# Patient Record
Sex: MALE | Race: BLACK OR AFRICAN AMERICAN | NOT HISPANIC OR LATINO | Employment: OTHER | URBAN - METROPOLITAN AREA
[De-identification: names, ages, dates, MRNs, and addresses within clinical notes are randomized per-mention and may not be internally consistent; named-entity substitution may affect disease eponyms.]

---

## 2018-10-02 ENCOUNTER — OFFICE VISIT - HEALTHEAST (OUTPATIENT)
Dept: FAMILY MEDICINE | Facility: CLINIC | Age: 48
End: 2018-10-02

## 2018-10-02 DIAGNOSIS — Z00.00 ANNUAL PHYSICAL EXAM: ICD-10-CM

## 2018-10-02 DIAGNOSIS — Z76.89 ESTABLISHING CARE WITH NEW DOCTOR, ENCOUNTER FOR: ICD-10-CM

## 2018-10-02 DIAGNOSIS — Z12.11 COLON CANCER SCREENING: ICD-10-CM

## 2018-10-02 DIAGNOSIS — D64.9 NORMOCYTIC NORMOCHROMIC ANEMIA: ICD-10-CM

## 2018-10-02 LAB
ALBUMIN SERPL-MCNC: 4.2 G/DL (ref 3.5–5)
ALP SERPL-CCNC: 50 U/L (ref 45–120)
ALT SERPL W P-5'-P-CCNC: 17 U/L (ref 0–45)
ANION GAP SERPL CALCULATED.3IONS-SCNC: 7 MMOL/L (ref 5–18)
AST SERPL W P-5'-P-CCNC: 18 U/L (ref 0–40)
BASOPHILS # BLD AUTO: 0 THOU/UL (ref 0–0.2)
BASOPHILS NFR BLD AUTO: 1 % (ref 0–2)
BILIRUB SERPL-MCNC: 1.3 MG/DL (ref 0–1)
BUN SERPL-MCNC: 15 MG/DL (ref 8–22)
CALCIUM SERPL-MCNC: 9.7 MG/DL (ref 8.5–10.5)
CHLORIDE BLD-SCNC: 109 MMOL/L (ref 98–107)
CHOLEST SERPL-MCNC: 155 MG/DL
CO2 SERPL-SCNC: 26 MMOL/L (ref 22–31)
CREAT SERPL-MCNC: 0.81 MG/DL (ref 0.7–1.3)
EOSINOPHIL # BLD AUTO: 0.1 THOU/UL (ref 0–0.4)
EOSINOPHIL NFR BLD AUTO: 2 % (ref 0–6)
ERYTHROCYTE [DISTWIDTH] IN BLOOD BY AUTOMATED COUNT: 13.5 % (ref 11–14.5)
FASTING STATUS PATIENT QL REPORTED: NO
GFR SERPL CREATININE-BSD FRML MDRD: >60 ML/MIN/1.73M2
GLUCOSE BLD-MCNC: 85 MG/DL (ref 70–125)
HBA1C MFR BLD: 5.5 % (ref 3.5–6)
HCT VFR BLD AUTO: 37.9 % (ref 40–54)
HDLC SERPL-MCNC: 72 MG/DL
HGB BLD-MCNC: 13.7 G/DL (ref 14–18)
LDLC SERPL CALC-MCNC: 64 MG/DL
LYMPHOCYTES # BLD AUTO: 1.7 THOU/UL (ref 0.8–4.4)
LYMPHOCYTES NFR BLD AUTO: 29 % (ref 20–40)
MCH RBC QN AUTO: 29.2 PG (ref 27–34)
MCHC RBC AUTO-ENTMCNC: 36.1 G/DL (ref 32–36)
MCV RBC AUTO: 81 FL (ref 80–100)
MONOCYTES # BLD AUTO: 0.4 THOU/UL (ref 0–0.9)
MONOCYTES NFR BLD AUTO: 8 % (ref 2–10)
NEUTROPHILS # BLD AUTO: 3.6 THOU/UL (ref 2–7.7)
NEUTROPHILS NFR BLD AUTO: 61 % (ref 50–70)
PLATELET # BLD AUTO: 200 THOU/UL (ref 140–440)
PMV BLD AUTO: 11.7 FL (ref 8.5–12.5)
POTASSIUM BLD-SCNC: 4 MMOL/L (ref 3.5–5)
PROT SERPL-MCNC: 6.9 G/DL (ref 6–8)
RBC # BLD AUTO: 4.69 MILL/UL (ref 4.4–6.2)
SODIUM SERPL-SCNC: 142 MMOL/L (ref 136–145)
TRIGL SERPL-MCNC: 97 MG/DL
WBC: 5.9 THOU/UL (ref 4–11)

## 2018-10-02 ASSESSMENT — MIFFLIN-ST. JEOR: SCORE: 1591.91

## 2018-10-03 ENCOUNTER — AMBULATORY - HEALTHEAST (OUTPATIENT)
Dept: FAMILY MEDICINE | Facility: CLINIC | Age: 48
End: 2018-10-03

## 2018-12-03 ENCOUNTER — RECORDS - HEALTHEAST (OUTPATIENT)
Dept: ADMINISTRATIVE | Facility: OTHER | Age: 48
End: 2018-12-03

## 2018-12-04 ENCOUNTER — RECORDS - HEALTHEAST (OUTPATIENT)
Dept: ADMINISTRATIVE | Facility: OTHER | Age: 48
End: 2018-12-04

## 2020-07-31 ENCOUNTER — OFFICE VISIT - HEALTHEAST (OUTPATIENT)
Dept: INTERNAL MEDICINE | Facility: CLINIC | Age: 50
End: 2020-07-31

## 2020-07-31 DIAGNOSIS — J30.2 SEASONAL ALLERGIC RHINITIS, UNSPECIFIED TRIGGER: ICD-10-CM

## 2020-07-31 ASSESSMENT — MIFFLIN-ST. JEOR: SCORE: 1646.34

## 2021-06-01 VITALS — WEIGHT: 172 LBS | HEIGHT: 66 IN | BODY MASS INDEX: 27.64 KG/M2

## 2021-06-04 VITALS
DIASTOLIC BLOOD PRESSURE: 72 MMHG | BODY MASS INDEX: 29.57 KG/M2 | WEIGHT: 184 LBS | SYSTOLIC BLOOD PRESSURE: 134 MMHG | HEART RATE: 73 BPM | HEIGHT: 66 IN | TEMPERATURE: 98.5 F | OXYGEN SATURATION: 98 %
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
## 2021-06-20 NOTE — PROGRESS NOTES
Please call patient and inform:  Overall labs look good. Your bad cholesterol is low and your good cholesterol is high! One of your liver function labs called bilirubin is mildly elevated. I would just repeat this in a few months. You do not have diabetes. Your hemoglobin is slightly low(your red blood cells) which could be due to a little iron deficiency but could also be due to some unnoticed mild blood loss. Therefore I do think it is a good idea to have that colonoscopy done!

## 2021-07-14 PROBLEM — I10 ESSENTIAL HYPERTENSION: Status: RESOLVED | Noted: 2018-10-02 | Resolved: 2018-10-02

## 2021-08-03 PROBLEM — I42.9 PRIMARY CARDIOMYOPATHY (H): Status: RESOLVED | Noted: 2018-10-02 | Resolved: 2018-10-02

## 2021-12-24 ENCOUNTER — HOSPITAL ENCOUNTER (EMERGENCY)
Facility: CLINIC | Age: 51
Discharge: HOME | End: 2021-12-24
Attending: EMERGENCY MEDICINE | Admitting: EMERGENCY MEDICINE
Payer: COMMERCIAL

## 2021-12-24 VITALS
OXYGEN SATURATION: 97 % | DIASTOLIC BLOOD PRESSURE: 79 MMHG | HEART RATE: 110 BPM | RESPIRATION RATE: 22 BRPM | TEMPERATURE: 98 F | SYSTOLIC BLOOD PRESSURE: 124 MMHG | BODY MASS INDEX: 28.83 KG/M2 | WEIGHT: 180 LBS

## 2021-12-24 DIAGNOSIS — R14.0 ABDOMINAL BLOATING: ICD-10-CM

## 2021-12-24 DIAGNOSIS — R00.0 TACHYCARDIA: ICD-10-CM

## 2021-12-24 DIAGNOSIS — R79.89 ELEVATED BRAIN NATRIURETIC PEPTIDE (BNP) LEVEL: ICD-10-CM

## 2021-12-24 PROBLEM — F10.11 ALCOHOL ABUSE, IN REMISSION: Status: ACTIVE | Noted: 2021-12-24

## 2021-12-24 PROBLEM — E83.40 DISORDER OF MAGNESIUM METABOLISM: Status: ACTIVE | Noted: 2021-12-24

## 2021-12-24 PROBLEM — R79.9 ABNORMAL BLOOD CHEMISTRY: Status: ACTIVE | Noted: 2021-12-24

## 2021-12-24 PROBLEM — D36.9 TUBULAR ADENOMA: Status: ACTIVE | Noted: 2018-12-03

## 2021-12-24 LAB
ALBUMIN SERPL-MCNC: 4 G/DL (ref 3.5–5)
ALP SERPL-CCNC: 69 U/L (ref 45–120)
ALT SERPL W P-5'-P-CCNC: 53 U/L (ref 0–45)
ANION GAP SERPL CALCULATED.3IONS-SCNC: 11 MMOL/L (ref 5–18)
AST SERPL W P-5'-P-CCNC: 54 U/L (ref 0–40)
ATRIAL RATE - MUSE: 106 BPM
BILIRUB DIRECT SERPL-MCNC: 0.4 MG/DL
BILIRUB SERPL-MCNC: 1.3 MG/DL (ref 0–1)
BNP SERPL-MCNC: 613 PG/ML (ref 0–40)
BUN SERPL-MCNC: 17 MG/DL (ref 8–22)
CALCIUM SERPL-MCNC: 8.9 MG/DL (ref 8.5–10.5)
CHLORIDE BLD-SCNC: 110 MMOL/L (ref 98–107)
CO2 SERPL-SCNC: 21 MMOL/L (ref 22–31)
CREAT SERPL-MCNC: 1.23 MG/DL (ref 0.7–1.3)
DIASTOLIC BLOOD PRESSURE - MUSE: 89 MMHG
GFR SERPL CREATININE-BSD FRML MDRD: 72 ML/MIN/1.73M2
GLUCOSE BLD-MCNC: 104 MG/DL (ref 70–125)
INTERPRETATION ECG - MUSE: NORMAL
LIPASE SERPL-CCNC: 40 U/L (ref 0–52)
P AXIS - MUSE: 59 DEGREES
POTASSIUM BLD-SCNC: 3.7 MMOL/L (ref 3.5–5)
PR INTERVAL - MUSE: 166 MS
PROT SERPL-MCNC: 6.3 G/DL (ref 6–8)
QRS DURATION - MUSE: 102 MS
QT - MUSE: 344 MS
QTC - MUSE: 456 MS
R AXIS - MUSE: 17 DEGREES
SODIUM SERPL-SCNC: 142 MMOL/L (ref 136–145)
SYSTOLIC BLOOD PRESSURE - MUSE: 125 MMHG
T AXIS - MUSE: -12 DEGREES
TROPONIN I SERPL-MCNC: 0.04 NG/ML (ref 0–0.29)
VENTRICULAR RATE- MUSE: 106 BPM

## 2021-12-24 PROCEDURE — 93005 ELECTROCARDIOGRAM TRACING: CPT | Performed by: EMERGENCY MEDICINE

## 2021-12-24 PROCEDURE — 99284 EMERGENCY DEPT VISIT MOD MDM: CPT

## 2021-12-24 PROCEDURE — 80048 BASIC METABOLIC PNL TOTAL CA: CPT | Performed by: EMERGENCY MEDICINE

## 2021-12-24 PROCEDURE — 82248 BILIRUBIN DIRECT: CPT | Performed by: EMERGENCY MEDICINE

## 2021-12-24 PROCEDURE — 84484 ASSAY OF TROPONIN QUANT: CPT | Performed by: EMERGENCY MEDICINE

## 2021-12-24 PROCEDURE — 83690 ASSAY OF LIPASE: CPT | Performed by: EMERGENCY MEDICINE

## 2021-12-24 PROCEDURE — 83880 ASSAY OF NATRIURETIC PEPTIDE: CPT | Performed by: EMERGENCY MEDICINE

## 2021-12-24 PROCEDURE — 36415 COLL VENOUS BLD VENIPUNCTURE: CPT | Performed by: EMERGENCY MEDICINE

## 2021-12-24 ASSESSMENT — ENCOUNTER SYMPTOMS
CHILLS: 0
ABDOMINAL DISTENTION: 1
BACK PAIN: 0
APPETITE CHANGE: 0
FEVER: 0
ABDOMINAL PAIN: 0
DIARRHEA: 0
SHORTNESS OF BREATH: 1
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
## 2021-12-25 NOTE — DISCHARGE INSTRUCTIONS
Because you have a history of cardiomyopathy, I checked your BNP level.  This is something that goes up with your heart is unrestrained.  I did peer to be elevated today.  Not sure what your previous levels have been, but I think it is important that you are seen by cardiology next week.  I placed an urgent referral for this.  I do not think this is why your abdomen feels bloated.  I also am sending you home with medication called omeprazole.  You can take this each morning.  After couple of days you should notice the bloating, acid reflux symptoms to improve.    In the meantime, if your shortness of breath returns, does not get any better with rest, or you are experiencing any chest pain, please come back for reevaluation or call 911

## 2021-12-25 NOTE — ED TRIAGE NOTES
"Pt denies abd pain but states he has been \"bloated\" for a couple of weeks.  Off/on.  Denies NVCD  "

## 2021-12-27 ENCOUNTER — PATIENT OUTREACH (OUTPATIENT)
Dept: FAMILY MEDICINE | Facility: CLINIC | Age: 51
End: 2021-12-27
Payer: COMMERCIAL

## 2021-12-27 NOTE — TELEPHONE ENCOUNTER
ED / Discharge Outreach Protocol    Patient Contact    Attempt # 1    Was call answered?  No.  Left message on voicemail with information to call me back.    Sadaf Swift RN

## 2021-12-28 NOTE — TELEPHONE ENCOUNTER
ED / Discharge Outreach Protocol    Patient Contact    Attempt # 2    Was call answered?  No.  Left message on voicemail with information to call me back.    Sadaf Swift RN

## 2022-01-01 ENCOUNTER — APPOINTMENT (OUTPATIENT)
Dept: ULTRASOUND IMAGING | Facility: CLINIC | Age: 52
End: 2022-01-01
Attending: EMERGENCY MEDICINE
Payer: COMMERCIAL

## 2022-01-01 ENCOUNTER — APPOINTMENT (OUTPATIENT)
Dept: CT IMAGING | Facility: CLINIC | Age: 52
End: 2022-01-01
Attending: EMERGENCY MEDICINE
Payer: COMMERCIAL

## 2022-01-01 ENCOUNTER — HOSPITAL ENCOUNTER (INPATIENT)
Facility: CLINIC | Age: 52
LOS: 2 days | Discharge: HOME | End: 2022-01-03
Attending: EMERGENCY MEDICINE | Admitting: FAMILY MEDICINE
Payer: COMMERCIAL

## 2022-01-01 DIAGNOSIS — I50.23 ACUTE ON CHRONIC SYSTOLIC CONGESTIVE HEART FAILURE (H): Primary | ICD-10-CM

## 2022-01-01 DIAGNOSIS — I50.20 SYSTOLIC CONGESTIVE HEART FAILURE, UNSPECIFIED HF CHRONICITY (H): ICD-10-CM

## 2022-01-01 PROBLEM — I50.9 CHF (CONGESTIVE HEART FAILURE) (H): Status: ACTIVE | Noted: 2022-01-01

## 2022-01-01 LAB
ALBUMIN SERPL-MCNC: 3.7 G/DL (ref 3.5–5)
ALP SERPL-CCNC: 74 U/L (ref 45–120)
ALT SERPL W P-5'-P-CCNC: 96 U/L (ref 0–45)
ANION GAP SERPL CALCULATED.3IONS-SCNC: 13 MMOL/L (ref 5–18)
AST SERPL W P-5'-P-CCNC: 58 U/L (ref 0–40)
BASOPHILS # BLD AUTO: 0.1 10E3/UL (ref 0–0.2)
BASOPHILS NFR BLD AUTO: 1 %
BILIRUB SERPL-MCNC: 1.5 MG/DL (ref 0–1)
BNP SERPL-MCNC: 1147 PG/ML (ref 0–40)
BUN SERPL-MCNC: 20 MG/DL (ref 8–22)
CALCIUM SERPL-MCNC: 9.1 MG/DL (ref 8.5–10.5)
CHLORIDE BLD-SCNC: 114 MMOL/L (ref 98–107)
CO2 SERPL-SCNC: 20 MMOL/L (ref 22–31)
CREAT SERPL-MCNC: 1.01 MG/DL (ref 0.7–1.3)
EOSINOPHIL # BLD AUTO: 0.1 10E3/UL (ref 0–0.7)
EOSINOPHIL NFR BLD AUTO: 1 %
ERYTHROCYTE [DISTWIDTH] IN BLOOD BY AUTOMATED COUNT: 14.7 % (ref 10–15)
GFR SERPL CREATININE-BSD FRML MDRD: >90 ML/MIN/1.73M2
GLUCOSE BLD-MCNC: 115 MG/DL (ref 70–125)
HCT VFR BLD AUTO: 37.1 % (ref 40–53)
HGB BLD-MCNC: 13.4 G/DL (ref 13.3–17.7)
IMM GRANULOCYTES # BLD: 0 10E3/UL
IMM GRANULOCYTES NFR BLD: 0 %
LIPASE SERPL-CCNC: 44 U/L (ref 0–52)
LYMPHOCYTES # BLD AUTO: 1.8 10E3/UL (ref 0.8–5.3)
LYMPHOCYTES NFR BLD AUTO: 38 %
MAGNESIUM SERPL-MCNC: 1.8 MG/DL (ref 1.8–2.6)
MCH RBC QN AUTO: 30.9 PG (ref 26.5–33)
MCHC RBC AUTO-ENTMCNC: 36.1 G/DL (ref 31.5–36.5)
MCV RBC AUTO: 86 FL (ref 78–100)
MONOCYTES # BLD AUTO: 0.4 10E3/UL (ref 0–1.3)
MONOCYTES NFR BLD AUTO: 8 %
NEUTROPHILS # BLD AUTO: 2.5 10E3/UL (ref 1.6–8.3)
NEUTROPHILS NFR BLD AUTO: 52 %
NRBC # BLD AUTO: 0 10E3/UL
NRBC BLD AUTO-RTO: 0 /100
PLATELET # BLD AUTO: 195 10E3/UL (ref 150–450)
POTASSIUM BLD-SCNC: 3.6 MMOL/L (ref 3.5–5)
PROT SERPL-MCNC: 5.9 G/DL (ref 6–8)
RBC # BLD AUTO: 4.34 10E6/UL (ref 4.4–5.9)
SARS-COV-2 RNA RESP QL NAA+PROBE: NEGATIVE
SODIUM SERPL-SCNC: 147 MMOL/L (ref 136–145)
TROPONIN I SERPL-MCNC: 0.04 NG/ML (ref 0–0.29)
WBC # BLD AUTO: 4.9 10E3/UL (ref 4–11)

## 2022-01-01 PROCEDURE — 74177 CT ABD & PELVIS W/CONTRAST: CPT

## 2022-01-01 PROCEDURE — 83690 ASSAY OF LIPASE: CPT | Performed by: EMERGENCY MEDICINE

## 2022-01-01 PROCEDURE — 93005 ELECTROCARDIOGRAM TRACING: CPT | Performed by: EMERGENCY MEDICINE

## 2022-01-01 PROCEDURE — 83735 ASSAY OF MAGNESIUM: CPT | Performed by: EMERGENCY MEDICINE

## 2022-01-01 PROCEDURE — 210N000002 HC R&B HEART CARE

## 2022-01-01 PROCEDURE — 80053 COMPREHEN METABOLIC PANEL: CPT | Performed by: EMERGENCY MEDICINE

## 2022-01-01 PROCEDURE — 85025 COMPLETE CBC W/AUTO DIFF WBC: CPT | Performed by: EMERGENCY MEDICINE

## 2022-01-01 PROCEDURE — 87635 SARS-COV-2 COVID-19 AMP PRB: CPT | Performed by: EMERGENCY MEDICINE

## 2022-01-01 PROCEDURE — 250N000011 HC RX IP 250 OP 636: Performed by: EMERGENCY MEDICINE

## 2022-01-01 PROCEDURE — 76705 ECHO EXAM OF ABDOMEN: CPT

## 2022-01-01 PROCEDURE — 96374 THER/PROPH/DIAG INJ IV PUSH: CPT

## 2022-01-01 PROCEDURE — 71275 CT ANGIOGRAPHY CHEST: CPT

## 2022-01-01 PROCEDURE — 84484 ASSAY OF TROPONIN QUANT: CPT | Performed by: EMERGENCY MEDICINE

## 2022-01-01 PROCEDURE — 36415 COLL VENOUS BLD VENIPUNCTURE: CPT | Performed by: EMERGENCY MEDICINE

## 2022-01-01 PROCEDURE — 99285 EMERGENCY DEPT VISIT HI MDM: CPT | Mod: 25

## 2022-01-01 PROCEDURE — 84443 ASSAY THYROID STIM HORMONE: CPT | Performed by: STUDENT IN AN ORGANIZED HEALTH CARE EDUCATION/TRAINING PROGRAM

## 2022-01-01 PROCEDURE — C9803 HOPD COVID-19 SPEC COLLECT: HCPCS

## 2022-01-01 PROCEDURE — 83880 ASSAY OF NATRIURETIC PEPTIDE: CPT | Performed by: EMERGENCY MEDICINE

## 2022-01-01 RX ORDER — IOPAMIDOL 755 MG/ML
100 INJECTION, SOLUTION INTRAVASCULAR ONCE
Status: COMPLETED | OUTPATIENT
Start: 2022-01-01 | End: 2022-01-01

## 2022-01-01 RX ORDER — FUROSEMIDE 10 MG/ML
60 INJECTION INTRAMUSCULAR; INTRAVENOUS ONCE
Status: COMPLETED | OUTPATIENT
Start: 2022-01-01 | End: 2022-01-01

## 2022-01-01 RX ADMIN — IOPAMIDOL 100 ML: 755 INJECTION, SOLUTION INTRAVENOUS at 20:39

## 2022-01-01 RX ADMIN — FUROSEMIDE 60 MG: 10 INJECTION, SOLUTION INTRAMUSCULAR; INTRAVENOUS at 22:55

## 2022-01-01 ASSESSMENT — ENCOUNTER SYMPTOMS
FEVER: 0
SORE THROAT: 0
SHORTNESS OF BREATH: 1
CHILLS: 0
ABDOMINAL DISTENTION: 1

## 2022-01-02 ENCOUNTER — APPOINTMENT (OUTPATIENT)
Dept: CARDIOLOGY | Facility: CLINIC | Age: 52
End: 2022-01-02
Payer: COMMERCIAL

## 2022-01-02 LAB
ALBUMIN SERPL-MCNC: 3.8 G/DL (ref 3.5–5)
ALP SERPL-CCNC: 84 U/L (ref 45–120)
ALT SERPL W P-5'-P-CCNC: 110 U/L (ref 0–45)
ANION GAP SERPL CALCULATED.3IONS-SCNC: 15 MMOL/L (ref 5–18)
AST SERPL W P-5'-P-CCNC: 69 U/L (ref 0–40)
ATRIAL RATE - MUSE: 110 BPM
BILIRUB SERPL-MCNC: 1.2 MG/DL (ref 0–1)
BUN SERPL-MCNC: 18 MG/DL (ref 8–22)
CALCIUM SERPL-MCNC: 8.9 MG/DL (ref 8.5–10.5)
CHLORIDE BLD-SCNC: 108 MMOL/L (ref 98–107)
CO2 SERPL-SCNC: 24 MMOL/L (ref 22–31)
CREAT SERPL-MCNC: 1.17 MG/DL (ref 0.7–1.3)
DIASTOLIC BLOOD PRESSURE - MUSE: NORMAL MMHG
GFR SERPL CREATININE-BSD FRML MDRD: 76 ML/MIN/1.73M2
GLUCOSE BLD-MCNC: 101 MG/DL (ref 70–125)
INTERPRETATION ECG - MUSE: NORMAL
LVEF ECHO: NORMAL
P AXIS - MUSE: 62 DEGREES
POTASSIUM BLD-SCNC: 3.6 MMOL/L (ref 3.5–5)
PR INTERVAL - MUSE: 170 MS
PROT SERPL-MCNC: 6.1 G/DL (ref 6–8)
QRS DURATION - MUSE: 94 MS
QT - MUSE: 286 MS
QTC - MUSE: 387 MS
R AXIS - MUSE: 33 DEGREES
SODIUM SERPL-SCNC: 147 MMOL/L (ref 136–145)
SYSTOLIC BLOOD PRESSURE - MUSE: NORMAL MMHG
T AXIS - MUSE: 37 DEGREES
TSH SERPL DL<=0.005 MIU/L-ACNC: 2.93 UIU/ML (ref 0.3–5)
VENTRICULAR RATE- MUSE: 110 BPM

## 2022-01-02 PROCEDURE — 93306 TTE W/DOPPLER COMPLETE: CPT | Mod: 26 | Performed by: INTERNAL MEDICINE

## 2022-01-02 PROCEDURE — 36415 COLL VENOUS BLD VENIPUNCTURE: CPT | Performed by: STUDENT IN AN ORGANIZED HEALTH CARE EDUCATION/TRAINING PROGRAM

## 2022-01-02 PROCEDURE — 250N000011 HC RX IP 250 OP 636: Performed by: STUDENT IN AN ORGANIZED HEALTH CARE EDUCATION/TRAINING PROGRAM

## 2022-01-02 PROCEDURE — 210N000002 HC R&B HEART CARE

## 2022-01-02 PROCEDURE — 80053 COMPREHEN METABOLIC PANEL: CPT | Performed by: STUDENT IN AN ORGANIZED HEALTH CARE EDUCATION/TRAINING PROGRAM

## 2022-01-02 PROCEDURE — 99223 1ST HOSP IP/OBS HIGH 75: CPT | Performed by: INTERNAL MEDICINE

## 2022-01-02 PROCEDURE — 99222 1ST HOSP IP/OBS MODERATE 55: CPT | Mod: GC | Performed by: STUDENT IN AN ORGANIZED HEALTH CARE EDUCATION/TRAINING PROGRAM

## 2022-01-02 PROCEDURE — 250N000013 HC RX MED GY IP 250 OP 250 PS 637: Performed by: INTERNAL MEDICINE

## 2022-01-02 PROCEDURE — 93306 TTE W/DOPPLER COMPLETE: CPT

## 2022-01-02 RX ORDER — ACETAMINOPHEN 650 MG/1
650 SUPPOSITORY RECTAL EVERY 6 HOURS PRN
Status: DISCONTINUED | OUTPATIENT
Start: 2022-01-02 | End: 2022-01-03 | Stop reason: HOSPADM

## 2022-01-02 RX ORDER — CARVEDILOL 3.12 MG/1
3.12 TABLET ORAL 2 TIMES DAILY WITH MEALS
Status: DISCONTINUED | OUTPATIENT
Start: 2022-01-02 | End: 2022-01-03 | Stop reason: HOSPADM

## 2022-01-02 RX ORDER — POLYETHYLENE GLYCOL 3350 17 G/17G
17 POWDER, FOR SOLUTION ORAL DAILY
Status: DISCONTINUED | OUTPATIENT
Start: 2022-01-02 | End: 2022-01-03 | Stop reason: HOSPADM

## 2022-01-02 RX ORDER — ACETAMINOPHEN 325 MG/1
650 TABLET ORAL EVERY 6 HOURS PRN
Status: DISCONTINUED | OUTPATIENT
Start: 2022-01-02 | End: 2022-01-03 | Stop reason: HOSPADM

## 2022-01-02 RX ORDER — FUROSEMIDE 10 MG/ML
60 INJECTION INTRAMUSCULAR; INTRAVENOUS EVERY 12 HOURS
Status: DISCONTINUED | OUTPATIENT
Start: 2022-01-02 | End: 2022-01-03

## 2022-01-02 RX ORDER — LIDOCAINE 40 MG/G
CREAM TOPICAL
Status: DISCONTINUED | OUTPATIENT
Start: 2022-01-02 | End: 2022-01-03 | Stop reason: HOSPADM

## 2022-01-02 RX ADMIN — FUROSEMIDE 60 MG: 10 INJECTION, SOLUTION INTRAMUSCULAR; INTRAVENOUS at 19:01

## 2022-01-02 RX ADMIN — CARVEDILOL 3.12 MG: 3.12 TABLET, FILM COATED ORAL at 12:00

## 2022-01-02 RX ADMIN — CARVEDILOL 3.12 MG: 3.12 TABLET, FILM COATED ORAL at 16:35

## 2022-01-02 RX ADMIN — FUROSEMIDE 60 MG: 10 INJECTION, SOLUTION INTRAMUSCULAR; INTRAVENOUS at 05:53

## 2022-01-02 ASSESSMENT — ACTIVITIES OF DAILY LIVING (ADL)
ADLS_ACUITY_SCORE: 3
WALKING_OR_CLIMBING_STAIRS_DIFFICULTY: NO
ADLS_ACUITY_SCORE: 3
ADLS_ACUITY_SCORE: 7
DOING_ERRANDS_INDEPENDENTLY_DIFFICULTY: NO
ADLS_ACUITY_SCORE: 3
ADLS_ACUITY_SCORE: 3
CONCENTRATING,_REMEMBERING_OR_MAKING_DECISIONS_DIFFICULTY: NO
TOILETING_ISSUES: NO
ADLS_ACUITY_SCORE: 7
DIFFICULTY_EATING/SWALLOWING: NO
FALL_HISTORY_WITHIN_LAST_SIX_MONTHS: NO
ADLS_ACUITY_SCORE: 3
HEARING_DIFFICULTY_OR_DEAF: NO
ADLS_ACUITY_SCORE: 3
DIFFICULTY_COMMUNICATING: NO
ADLS_ACUITY_SCORE: 3
WEAR_GLASSES_OR_BLIND: NO
ADLS_ACUITY_SCORE: 3
DRESSING/BATHING_DIFFICULTY: NO
ADLS_ACUITY_SCORE: 3
ADLS_ACUITY_SCORE: 7
ADLS_ACUITY_SCORE: 3
PATIENT_/_FAMILY_COMMUNICATION_STYLE: SPOKEN LANGUAGE (ENGLISH OR BILINGUAL)

## 2022-01-02 ASSESSMENT — MIFFLIN-ST. JEOR
SCORE: 1614.69
SCORE: 1628.3

## 2022-01-02 NOTE — PHARMACY-ADMISSION MEDICATION HISTORY
Pharmacy Note - Admission Medication History    Pertinent Provider Information: ...stopped BP meds ~4 years ago     ______________________________________________________________________    Prior To Admission (PTA) med list completed and updated in EMR.       PTA Med List   Medication Sig Last Dose     omeprazole (PRILOSEC) 20 MG DR capsule Take 1 capsule (20 mg) by mouth daily 1/1/2022 at Unknown time       Information source(s): Patient and CareEverywhere/[facility]  Method of interview communication: in-person    Summary of Changes to PTA Med List  New: none  Discontinued: enalapril, carvedilol  Changed: none    Patient was asked about OTC/herbal products specifically.  PTA med list reflects this.    In the past week, patient estimated taking medication this percent of the time:  greater than 90%.    Allergies were reviewed, assessed, and updated with the patient.        The information provided in this note is only as accurate as the sources available at the time of the update(s).    Thank you for the opportunity to participate in the care of this patient.    Tadeo Richardson RPH  1/1/2022 9:18 PM

## 2022-01-02 NOTE — PROGRESS NOTES
Echocardiogram shows severe reduction left ventricular systolic function with an estimated ejection fraction of 25%.  The reduction left ventricular systolic function is global in nature.  There is moderately severe MR, moderate tricuspid insufficiency with a right ventricular systolic pressure estimate of 64 mm plus right atrial pressure) significantly elevated).    When he was treated for his initial cardiomyopathy which normalized, he was taking Toprol, digoxin, lisinopril, and furosemide.  More recently he was on carvedilol 3.125 mg twice daily.  We will start him on carvedilol 3.125 mg twice daily and titrate up.  Continue intravenous diuretics tonight, switch to oral Lasix tomorrow and hopefully can discharge soon.    He will need a early follow-up with one of our nurse practitioners here at [facility] in the next 1 to 2 weeks.

## 2022-01-02 NOTE — PROGRESS NOTES
Daily Progress Note    Assessment & Plan: John Iqbal is a 51 year old male admitted on 1/1/2022. He has a history of CHF secondary to alcohol use disorder, HTN and was admitted for dyspnea with exertion concerning for CHF exacerbation.  Active Problems:    CHF (congestive heart failure) (H)    CHF Exacerbation  Right sided heart failure  BNP 1147 on admission with patient complaining of dyspnea on exertion. Previous echo on 1/7/2021 significant for EF of 57% and he has been on no medications. Echo today revealed severe reduction in LV systolic function with EF of 25%. Moderate-severe MR, moderate tricuspid insufficiency and RA pressure elevated. This change in status believed to be due to life stressors, resumption of alcohol use, and significant use of energy drinks.   - Cardiology consulted, recommendations appreciated   - Will continue IV lasix today, transition to PO tomorrow   - Start Carvedilol BID  - Strict I/O, daily weight   - Encourage lifestyle modifications: limit energy drinks, alcohol abstinence    Transaminitis  ALT 96-->110, AST 58-->69. Believed to be due to hepatic congestion 2/2 CHF exacerbation.   - Trend CMP in AM    Alcohol Use Disorder  Patients states he has had 4-5 drinks while in [place] just prior to symptom onset. He otherwise does not drink often but is willing to quit completely. Discussion today very positive.  - Continue to encourage cessation     Patient desires Covid booster upon discharge.     Diet: Combination Diet Low Saturated Fat Na <2400mg Diet, No Caffeine Diet    DVT Prophylaxis: Low Risk/Ambulatory with no VTE prophylaxis indicated  Miranda Catheter: Not present  Fluids: PO  Central Lines: None  Code Status: Full Code      Barriers to discharge: Medical   Anticipated discharge day: Tomorrow  Disposition: Home  Status: Inpatient  Length of Stay: 1     Patient discussed with attending physician, Dr. Chris Yepez , who agrees with the plan.     Clarence Ulrich DO  PGY1  [facility]  Pager: [phone] (from 8AM-5:30PM)  Please call the senior pager with any questions or concerns, 24/7: [phone].    Subjective/Interval events:  Admitted overnight. Tolerated IV lasix well. Now feeling much better and able to ambulate well. On room air comfortably.   ROS: Denies headache, dizziness, chest pain, shortness of breath, fevers, chills, nausea, abdominal pain, diarrhea.      Objective  Physical Exam   Vital Signs: Temp: 98.4  F (36.9  C) Temp src: Oral BP: 126/81 Pulse: 100   Resp: 16 SpO2: 95 % O2 Device: None (Room air)    Weight: 175 lbs 8 oz  Constitutional: awake, alert, cooperative, no apparent distress, and appears stated age  Respiratory: No increased work of breathing, good air exchange, clear to auscultation bilaterally, no crackles or wheezing  Cardiovascular: RRR, 3-4/6 systolic murmur  GI: No scars, normal bowel sounds, soft, non-distended, non-tender, no masses palpated, no hepatosplenomegally  Skin: normal skin color, texture, turgor  Musculoskeletal: There is no redness, warmth, or swelling of the joints.  Full range of motion noted.  Motor strength is 5 out of 5 all extremities bilaterally.  Tone is normal.  Neurologic: Awake, alert, oriented to name, place and time.  Cranial nerves II-XII are grossly intact.     Intake/Output last 3 shifts  I/O last 3 completed shifts:  In: -   Out: 950 [Urine:950]  Pertinent Labs   Recent Labs   Lab 01/01/22 2024   WBC 4.9   HGB 13.4   HCT 37.1*        Recent Labs   Lab 01/02/22  0528 01/01/22 2024   * 147*   CO2 24 20*   BUN 18 20   ALBUMIN 3.8 3.7   ALKPHOS 84 74   * 96*   AST 69* 58*     No results for input(s): INR, PTT in the last 168 hours.    Invalid input(s): APTT  Pertinent Radiology    Echocardiogram Complete  Result Date: 1/2/2022  Interpretation Summary  The left ventricle is moderately dilated. The visual ejection fraction is 25-30%.  There is severe global hypokinesia of the left ventricle. The right ventricle is mildly dilated. Mildly decreased right ventricular systolic function There is moderately severe (3+) mitral regurgitation. There is moderate (2+) tricuspid regurgitation. The right ventricular systolic pressure is approximated at 64mmHg plus the right atrial pressure. Right ventricular diastolic pressure is approximated at 15mmHg plus the right atrial pressure. The ascending aorta is Mildly dilated. IVC diameter >2.1 cm collapsing <50% with sniff suggests a high RA pressure estimated at 15 mmHg or greater. Small pericardial effusion The rhythm was sinus tachycardia.     Abdomen US, limited (RUQ only)  Result Date: 1/1/2022  EXAM: US ABDOMEN LIMITED LOCATION: [facility]   IMPRESSION: 1.  Significant gallbladder wall thickening. No gallstones are seen.     CT Abdomen Pelvis w Contrast  Result Date: 1/1/2022  EXAM: CT ABDOMEN PELVIS W CONTRAST LOCATION: [facility]  IMPRESSION: 1.  Markedly enlarged heart. 2.  Passive congestion of the liver consistent with right-sided heart failure. 3.  Edematous gallbladder, likely secondary to right-sided heart failure, if clinical concern persists, consider correlation with ultrasound. 4.  Diverticulosis. No diverticulitis, colitis, or obstruction.    CT Chest Pulmonary Embolism w Contrast  Result Date: 1/1/2022  EXAM: CT CHEST PULMONARY EMBOLISM W CONTRAST LOCATION:   IMPRESSION: 1.  No pulmonary embolism. 2.  Moderate global cardiomegaly. Reflux of contrast into the dilated IVC consistent with right heart dysfunction and/or elevated pressures. 3.  Mild basilar opacities suggestive of atelectasis. No pulmonary edema. 4.  No pleural effusion.     Current Medications.     carvedilol  3.125 mg Oral BID w/meals     furosemide  60 mg Intravenous Q12H     polyethylene glycol  17 g Oral Daily     sodium chloride (PF)  3 mL Intracatheter Q8H     PRN:acetaminophen **OR** acetaminophen, lidocaine 4%,  lidocaine (buffered or not buffered), melatonin, sodium chloride (PF)    This note was created with help of Dragon dictation system. Grammatical /typing errors are not intentional.
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
## 2022-01-02 NOTE — ED NOTES
"Attempted to call report. RN to call back. Denies pain. Has been up independent to bathroom. Reports \"I feel better, less bloated already.\"   /73   Pulse 101   Temp (!) 96.3  F (35.7  C) (Temporal)   Resp (!) 37   Wt 81.6 kg (180 lb)   SpO2 98%   BMI 28.83 kg/m      "
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
## 2022-01-02 NOTE — PLAN OF CARE
Problem: Adjustment to Illness (Heart Failure)  Goal: Optimal Coping  Outcome: No Change     Problem: Cardiac Output Decreased (Heart Failure)  Goal: Optimal Cardiac Output  Outcome: No Change     Problem: Dysrhythmia (Heart Failure)  Goal: Stable Heart Rate and Rhythm  Outcome: No Change   Pt admitted with CHF exacerbation from the ED.  Pt c/o SOB and chest discomfort.  Pt has HX of bout of CHF 15 years ago.  Pt 's on admit with PVC's noted.  Pt states he drank an energy drink in the evening when he was feeling fatigued.  Pt denies chest pain. Pt instructed to decrease his fluid intake and measure his urine output.

## 2022-01-02 NOTE — ED TRIAGE NOTES
Patient is here with shortness of breath. He was here on the 25th and given omeprazole which has not helped. He also had an echo and blood tests and all was clear.

## 2022-01-03 VITALS
SYSTOLIC BLOOD PRESSURE: 99 MMHG | WEIGHT: 171.3 LBS | OXYGEN SATURATION: 98 % | HEART RATE: 100 BPM | DIASTOLIC BLOOD PRESSURE: 76 MMHG | HEIGHT: 67 IN | RESPIRATION RATE: 18 BRPM | TEMPERATURE: 97.1 F | BODY MASS INDEX: 26.89 KG/M2

## 2022-01-03 PROBLEM — I50.23 ACUTE ON CHRONIC SYSTOLIC CONGESTIVE HEART FAILURE (H): Status: ACTIVE | Noted: 2022-01-01

## 2022-01-03 PROBLEM — F10.20 ALCOHOL USE DISORDER, MODERATE, DEPENDENCE (H): Status: ACTIVE | Noted: 2021-12-24

## 2022-01-03 LAB
ALBUMIN SERPL-MCNC: 3.6 G/DL (ref 3.5–5)
ALP SERPL-CCNC: 84 U/L (ref 45–120)
ALT SERPL W P-5'-P-CCNC: 113 U/L (ref 0–45)
ANION GAP SERPL CALCULATED.3IONS-SCNC: 14 MMOL/L (ref 5–18)
AST SERPL W P-5'-P-CCNC: 61 U/L (ref 0–40)
BILIRUB SERPL-MCNC: 1.2 MG/DL (ref 0–1)
BUN SERPL-MCNC: 22 MG/DL (ref 8–22)
CALCIUM SERPL-MCNC: 9 MG/DL (ref 8.5–10.5)
CHLORIDE BLD-SCNC: 105 MMOL/L (ref 98–107)
CO2 SERPL-SCNC: 27 MMOL/L (ref 22–31)
CREAT SERPL-MCNC: 1.11 MG/DL (ref 0.7–1.3)
GFR SERPL CREATININE-BSD FRML MDRD: 81 ML/MIN/1.73M2
GLUCOSE BLD-MCNC: 106 MG/DL (ref 70–125)
POTASSIUM BLD-SCNC: 3 MMOL/L (ref 3.5–5)
PROT SERPL-MCNC: 6 G/DL (ref 6–8)
SODIUM SERPL-SCNC: 146 MMOL/L (ref 136–145)

## 2022-01-03 PROCEDURE — 250N000013 HC RX MED GY IP 250 OP 250 PS 637: Performed by: INTERNAL MEDICINE

## 2022-01-03 PROCEDURE — 80053 COMPREHEN METABOLIC PANEL: CPT

## 2022-01-03 PROCEDURE — 36415 COLL VENOUS BLD VENIPUNCTURE: CPT

## 2022-01-03 PROCEDURE — 99233 SBSQ HOSP IP/OBS HIGH 50: CPT | Performed by: INTERNAL MEDICINE

## 2022-01-03 PROCEDURE — 99238 HOSP IP/OBS DSCHRG MGMT 30/<: CPT | Mod: GC | Performed by: STUDENT IN AN ORGANIZED HEALTH CARE EDUCATION/TRAINING PROGRAM

## 2022-01-03 PROCEDURE — 250N000011 HC RX IP 250 OP 636: Performed by: STUDENT IN AN ORGANIZED HEALTH CARE EDUCATION/TRAINING PROGRAM

## 2022-01-03 PROCEDURE — 82040 ASSAY OF SERUM ALBUMIN: CPT

## 2022-01-03 RX ORDER — LISINOPRIL 5 MG/1
5 TABLET ORAL DAILY
Qty: 30 TABLET | Refills: 0 | Status: SHIPPED | OUTPATIENT
Start: 2022-01-04 | End: 2022-02-10

## 2022-01-03 RX ORDER — POTASSIUM CHLORIDE 1500 MG/1
40 TABLET, EXTENDED RELEASE ORAL ONCE
Status: COMPLETED | OUTPATIENT
Start: 2022-01-03 | End: 2022-01-03

## 2022-01-03 RX ORDER — FUROSEMIDE 40 MG
40 TABLET ORAL DAILY
Status: DISCONTINUED | OUTPATIENT
Start: 2022-01-04 | End: 2022-01-03 | Stop reason: HOSPADM

## 2022-01-03 RX ORDER — SPIRONOLACTONE 25 MG/1
25 TABLET ORAL DAILY
Status: DISCONTINUED | OUTPATIENT
Start: 2022-01-03 | End: 2022-01-03 | Stop reason: HOSPADM

## 2022-01-03 RX ORDER — CARVEDILOL 3.12 MG/1
3.12 TABLET ORAL 2 TIMES DAILY WITH MEALS
Qty: 60 TABLET | Refills: 0 | Status: SHIPPED | OUTPATIENT
Start: 2022-01-03 | End: 2022-02-10

## 2022-01-03 RX ORDER — FUROSEMIDE 40 MG
40 TABLET ORAL DAILY
Qty: 30 TABLET | Refills: 0 | Status: SHIPPED | OUTPATIENT
Start: 2022-01-04 | End: 2022-01-13

## 2022-01-03 RX ORDER — LISINOPRIL 5 MG/1
5 TABLET ORAL DAILY
Status: DISCONTINUED | OUTPATIENT
Start: 2022-01-03 | End: 2022-01-03 | Stop reason: HOSPADM

## 2022-01-03 RX ORDER — SPIRONOLACTONE 25 MG/1
25 TABLET ORAL DAILY
Qty: 30 TABLET | Refills: 0 | Status: SHIPPED | OUTPATIENT
Start: 2022-01-04 | End: 2022-02-10

## 2022-01-03 RX ADMIN — LISINOPRIL 5 MG: 5 TABLET ORAL at 09:33

## 2022-01-03 RX ADMIN — POTASSIUM CHLORIDE 40 MEQ: 1500 TABLET, EXTENDED RELEASE ORAL at 09:33

## 2022-01-03 RX ADMIN — CARVEDILOL 3.12 MG: 3.12 TABLET, FILM COATED ORAL at 08:55

## 2022-01-03 RX ADMIN — FUROSEMIDE 60 MG: 10 INJECTION, SOLUTION INTRAMUSCULAR; INTRAVENOUS at 06:58

## 2022-01-03 RX ADMIN — SPIRONOLACTONE 25 MG: 25 TABLET ORAL at 09:33

## 2022-01-03 ASSESSMENT — ACTIVITIES OF DAILY LIVING (ADL)
ADLS_ACUITY_SCORE: 3

## 2022-01-03 ASSESSMENT — MIFFLIN-ST. JEOR: SCORE: 1595.64

## 2022-01-03 NOTE — PLAN OF CARE
Discharge education not provided due to telling the patient paperwork would be ready by 1215. Went into the room to do discharge education on meds, follow up appointments, and daily care. Could not go over this due to patient already leaving the room and not notifying anyone he was leaving.    Possible Anticholinergic Overdose (benadryl)  Unknown if intentional or not  Review of her chart reveals that she had psychiatric admission for intentional Benadryl overdose in November of 2020  Family found her at [facility] [address] [place] [address] on [address] in [place] with altered mental status  She is disoriented, tachycardic, tachypneic, upon arrival to the ER  Family unsure what may have happened to her bofriends reports that she texted her boyfriend earlier in the day stating that she is not having a good day  He also reporte dthat he thinks that she "took something" and he believes it may be benadryl  She received  Magnesium sulfate 2 g potassium chloride 20 mEq, normal saline 1 L in emergency room  ER attending discussed case with  Reporting control  Recommendations given  Continue with supportive care serial EKGs,  Sodium bicarbonate 1-2 mEq per kg if QRS with  more than 100 ms Benzodiazepines for agitation    Admit to [facility]  Cardiopulmonary monitoring  Keep NPO for now   will Advance diet  His mental status improved risk of aspiration is not a  Concerned  IV normal saline  Aspiration precautions  Seizure precaution  Monitor Airway, respiratory effort, SPO2  Ativan 2 mg IV p r n  for Agitation  Serial EKGs  Monitor I/O, daily weights  Monitor vital signs closely  Monitor for new onset seizures  Toxicology consult  Am labs  Supportive care

## 2022-01-03 NOTE — DISCHARGE SUMMARY
[facility]  Discharge Summary - Medicine & Pediatrics       Date of Admission:  1/1/2022  Date of Discharge:  1/3/2022  Discharging Provider: Cassandra Medeiros MD   Discharge Service: Teaching Service    Discharge Diagnoses   Active Problems:    Alcohol use disorder, moderate, dependence (H)    Acute on chronic systolic congestive heart failure (H)    Follow-ups Needed After Discharge   Follow-up with PCP, have basic metabolic panel within 1 week  Follow-up with cardiology within 1 week as scheduled    Discharge Disposition   Discharged to home  Condition at discharge: Good    Hospital Course   John Iqbal was admitted on 1/1/2022 for CHF exacerbation.  The following problems were addressed during his hospitalization:    Acute decompensated LV systolic congestive heart failure  On admission patient with dyspnea on exertion, BNP 1147, and LVEF 25%.  Echo also showed moderate to severe mitral regurgitation, tricuspid regurgitation, and global cardiomegaly.  Prior echocardiogram on 1/7/2021 showed EF of 57% and patient was not taking any medications prior to admission.  Unclear etiology of acute exacerbation of CHF, but recent excessive alcohol intake is most likely etiology at this time.  Cardiology was consulted and assisted in guiding diuresis while inpatient.  On discharge patient is starting the following medications:    Furosemide 40 mg daily    Spironolactone 25 mg daily    Lisinopril 5 mg daily    Carvedilol 3.1 mg p.o.    Then Follow-up with cardiology in 1 week and should have Outpatient cardiac MRI stress test in 1 month    Alcohol use disorder  Admission, patient recently had binge drinking episode.  Cessation was encouraged but patient is denying resources at time of discharge.  Says he can quit on his own.  Hopefully patient can refrain from alcohol use and work with outpatient providers on this.    Consultations This Hospital Stay   CARDIOLOGY IP CONSULT    Code Status    Full Code     The patient was discussed with Dr. Gume Medeiros MD PGY-3  Teaching Service  [facility] HEART CARE  [address] [facility] [address]  Phone: [phone]  Fax: [phone]  ______________________________________________________________________    Physical Exam   Vital Signs: Temp: 97.1  F (36.2  C) Temp src: Axillary BP: 99/76 Pulse: 100   Resp: 18 SpO2: 98 % O2 Device: None (Room air)   Weight: 171 lbs 4.8 oz  Physical Exam  Constitutional:       General: He is not in acute distress.     Appearance: He is not ill-appearing.   HENT:      Head: Normocephalic and atraumatic.      Mouth/Throat:      Mouth: Mucous membranes are moist.   Eyes:      General: No scleral icterus.     Extraocular Movements: Extraocular movements intact.      Conjunctiva/sclera: Conjunctivae normal.   Cardiovascular:      Rate and Rhythm: Normal rate and regular rhythm.      Pulses: Normal pulses.      Heart sounds: Normal heart sounds. No murmur heard.      Pulmonary:      Effort: Pulmonary effort is normal. No respiratory distress.      Breath sounds: Normal breath sounds. No wheezing or rales.   Abdominal:      General: Bowel sounds are normal. There is no distension.      Palpations: Abdomen is soft.      Tenderness: There is no abdominal tenderness.   Musculoskeletal:         General: Normal range of motion.      Right lower leg: No edema.      Left lower leg: No edema.   Skin:     General: Skin is warm and dry.      Findings: No rash.   Neurological:      General: No focal deficit present.      Mental Status: He is alert and oriented to person, place, and time.   Psychiatric:         Mood and Affect: Mood normal.         Behavior: Behavior normal.           Primary Care Physician   Nicolás Rascon MD    Discharge Orders      Reason for your hospital stay    You were hospitalized for congestive heart failure.  We treated  you with IV diruretics (water  pill) and you will need close follow-up with cardiology - heart failure clinic.     Activity    Your activity upon discharge: activity as tolerated     Follow-up and recommended labs and tests     Follow up with primary care provider, Nicolás Rascon MD, within 7 days for hospital follow- up.  The following labs/tests are recommended: Basic Metabolic Panel.    Follow up with Heart Failure Clinic, within 1 week  for hospital follow- up. No follow up labs or test are needed.     Diet    Follow this diet upon discharge:  Combination Diet Low Saturated Fat Na <2400mg Diet, No Caffeine Diet       Significant Results and Procedures   Most Recent 3 BMP's:Recent Labs   Lab Test 01/03/22  0416 01/02/22  0528 01/01/22 2024   * 147* 147*   POTASSIUM 3.0* 3.6 3.6   CHLORIDE 105 108* 114*   CO2 27 24 20*   BUN 22 18 20   CR 1.11 1.17 1.01   ANIONGAP 14 15 13   ANGELA 9.0 8.9 9.1    101 115     Most Recent 2 LFT's:Recent Labs   Lab Test 01/03/22 0416 01/02/22 0528   AST 61* 69*   * 110*   ALKPHOS 84 84   BILITOTAL 1.2* 1.2*     Results for orders placed or performed during the hospital encounter of 01/01/22   CT Chest Pulmonary Embolism w Contrast    Impression    IMPRESSION:    1.  No pulmonary embolism.    2.  Moderate global cardiomegaly. Reflux of contrast into the dilated IVC consistent with right heart dysfunction and/or elevated pressures.    3.  Mild basilar opacities suggestive of atelectasis. No pulmonary edema.    4.  No pleural effusion.       CT Abdomen Pelvis w Contrast    Impression    IMPRESSION:   1.  Markedly enlarged heart.   2.  Passive congestion of the liver consistent with right-sided heart failure.  3.  Edematous gallbladder, likely secondary to right-sided heart failure, if clinical concern persists, consider correlation with ultrasound.  4.  Diverticulosis. No diverticulitis, colitis, or obstruction.   Abdomen US, limited (RUQ only)    Impression    IMPRESSION:  1.   Significant gallbladder wall thickening. No gallstones are seen.       Echocardiogram Complete   Result Value Ref Range    LVEF  25-30%          Discharge Medications   Current Discharge Medication List      START taking these medications    Details   carvedilol (COREG) 3.125 MG tablet Take 1 tablet (3.125 mg) by mouth 2 times daily (with meals)  Qty: 60 tablet, Refills: 0    Associated Diagnoses: Acute on chronic systolic congestive heart failure (H)      furosemide (LASIX) 40 MG tablet Take 1 tablet (40 mg) by mouth daily  Qty: 30 tablet, Refills: 0    Associated Diagnoses: Acute on chronic systolic congestive heart failure (H)      lisinopril (ZESTRIL) 5 MG tablet Take 1 tablet (5 mg) by mouth daily  Qty: 30 tablet, Refills: 0    Associated Diagnoses: Acute on chronic systolic congestive heart failure (H)      spironolactone (ALDACTONE) 25 MG tablet Take 1 tablet (25 mg) by mouth daily  Qty: 30 tablet, Refills: 0    Associated Diagnoses: Acute on chronic systolic congestive heart failure (H)         STOP taking these medications       omeprazole (PRILOSEC) 20 MG DR capsule Comments:   Reason for Stopping:             Allergies   Allergies   Allergen Reactions     Magnesium Sulfate Shortness Of Breath     Other reaction(s): Diaphoresis, Flushing  Happened with IV Magnesium replacement

## 2022-01-03 NOTE — PLAN OF CARE
Problem: Adjustment to Illness (Heart Failure)  Goal: Optimal Coping  Outcome: No Change     Problem: Cardiac Output Decreased (Heart Failure)  Goal: Optimal Cardiac Output  Outcome: No Change     Problem: Dysrhythmia (Heart Failure)  Goal: Stable Heart Rate and Rhythm  Outcome: No Change   Pt comfortable and in good spirits.  Sleeping soundly.  Denies pain.  VSS.  Pt receiving IV Furosemide and monitoring I & O.
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
## 2022-01-04 ENCOUNTER — PATIENT OUTREACH (OUTPATIENT)
Dept: CARE COORDINATION | Facility: CLINIC | Age: 52
End: 2022-01-04
Payer: COMMERCIAL

## 2022-01-04 DIAGNOSIS — Z71.89 OTHER SPECIFIED COUNSELING: ICD-10-CM

## 2022-01-04 NOTE — PROGRESS NOTES
"Clinic Care Coordination Contact  [facility]: Post-Discharge Note  SITUATION                                                      Admission:    Admission Date: 01/01/22   Reason for Admission: Alcohol use disorder, moderate, dependence  Discharge:   Discharge Date: 01/03/22  Discharge Diagnosis: Alcohol use disorder, moderate, dependence    BACKGROUND                                                      John Iqbal was admitted on 1/1/2022 for CHF exacerbation.  The following problems were addressed during his hospitalization:     Acute decompensated LV systolic congestive heart failure  On admission patient with dyspnea on exertion, BNP 1147, and LVEF 25%.  Echo also showed moderate to severe mitral regurgitation, tricuspid regurgitation, and global cardiomegaly.  Prior echocardiogram on 1/7/2021 showed EF of 57% and patient was not taking any medications prior to admission.  Unclear etiology of acute exacerbation of CHF, but recent excessive alcohol intake is most likely etiology at this time.  Cardiology was consulted and assisted in guiding diuresis while inpatient.  On discharge patient is starting the following medications:    Furosemide 40 mg daily    Spironolactone 25 mg daily    Lisinopril 5 mg daily    Carvedilol 3.1 mg p.o.    Then Follow-up with cardiology in 1 week and should have Outpatient cardiac MRI stress test in 1 month     Alcohol use disorder  Admission, patient recently had binge drinking episode.  Cessation was encouraged but patient is denying resources at time of discharge.  Says he can quit on his own.  Hopefully patient can refrain from alcohol use and work with outpatient providers on this.       ASSESSMENT      Enrollment  Primary Care Care Coordination Status: Declined    Discharge Assessment  How are you doing now that you are home?: \" I'm feeling great im working right now\"  How are your symptoms? (Red Flag symptoms escalate to triage hotline per guidelines): " Improved  Do you feel your condition is stable enough to be safe at home until your provider visit?: Yes  Does the patient have their discharge instructions? : Yes  Does the patient have questions regarding their discharge instructions? : No  Were you started on any new medications or were there changes to any of your previous medications? : Yes  Does the patient have all of their medications?: Yes  Do you have questions regarding any of your medications? : No  Do you have all of your needed medical supplies or equipment (DME)?  (i.e. oxygen tank, CPAP, cane, etc.): Yes  Discharge follow-up appointment scheduled within 14 calendar days? : Yes  Discharge Follow Up Appointment Date: 01/12/22  Discharge Follow Up Appointment Scheduled with?: Specialty Care Provider    Post-op ([facility] Only)  If the patient had a surgery or procedure, do they have any questions for a nurse?: No           PLAN                                                      Outpatient Plan:   Your activity upon discharge: activity as tolerated          Follow-up and recommended labs and tests      Follow up with primary care provider, Nicolás Rascon MD, within 7 days for hospital follow- up.  The following labs/tests are recommended: Basic Metabolic Panel.    Follow up with Heart Failure Clinic, within 1 week  for hospital follow- up. No follow up labs or test are needed.          Diet     Follow this diet upon discharge:  Combination Diet Low Saturated Fat Na <2400mg Diet, No Caffeine Diet          Future Appointments   Date Time Provider Department Center   1/12/2022  2:50 PM Marissa Hussein APRN CNP [facility]   1/12/2022  3:30 PM [facility] HEART FAILURE RN [facility]         For any urgent concerns, please contact our 24 hour nurse triage line: [phone] ([phone])         Yaneli Remy MA

## 2022-01-11 PROBLEM — I42.0 DILATED CARDIOMYOPATHY (H): Status: ACTIVE | Noted: 2022-01-11

## 2022-01-11 PROBLEM — I49.3 PVC'S (PREMATURE VENTRICULAR CONTRACTIONS): Status: ACTIVE | Noted: 2022-01-11

## 2022-01-12 ENCOUNTER — LAB (OUTPATIENT)
Dept: LAB | Facility: CLINIC | Age: 52
End: 2022-01-12
Payer: COMMERCIAL

## 2022-01-12 ENCOUNTER — TELEPHONE (OUTPATIENT)
Dept: CARDIOLOGY | Facility: CLINIC | Age: 52
End: 2022-01-12

## 2022-01-12 DIAGNOSIS — I42.0 DILATED CARDIOMYOPATHY (H): Primary | ICD-10-CM

## 2022-01-12 DIAGNOSIS — I42.0 DILATED CARDIOMYOPATHY (H): ICD-10-CM

## 2022-01-12 LAB
ANION GAP SERPL CALCULATED.3IONS-SCNC: 14 MMOL/L (ref 5–18)
BUN SERPL-MCNC: 20 MG/DL (ref 8–22)
CALCIUM SERPL-MCNC: 9 MG/DL (ref 8.5–10.5)
CHLORIDE BLD-SCNC: 104 MMOL/L (ref 98–107)
CO2 SERPL-SCNC: 24 MMOL/L (ref 22–31)
CREAT SERPL-MCNC: 1.25 MG/DL (ref 0.7–1.3)
GFR SERPL CREATININE-BSD FRML MDRD: 70 ML/MIN/1.73M2
GLUCOSE BLD-MCNC: 104 MG/DL (ref 70–125)
POTASSIUM BLD-SCNC: 4 MMOL/L (ref 3.5–5)
SODIUM SERPL-SCNC: 142 MMOL/L (ref 136–145)

## 2022-01-12 PROCEDURE — 82374 ASSAY BLOOD CARBON DIOXIDE: CPT

## 2022-01-12 PROCEDURE — 36415 COLL VENOUS BLD VENIPUNCTURE: CPT

## 2022-01-12 NOTE — TELEPHONE ENCOUNTER
Spoke with pt and he has 4 tabs left of a pick of 30 tabs on 1-4-22. He increased his lasix for 40 mg daily to 80 mg + daily so that he could pee. Spoke with Marissa and will get lab work done today. Will have lab work done today to check potassium and kidney function.

## 2022-01-12 NOTE — TELEPHONE ENCOUNTER
----- Message from Sandi Daley sent at 1/12/2022  3:38 PM CST -----  Regarding: Bella Pt  Patient has already contacted their pharmacy. The medication or refill issue is below:    Ordering Cardiologist: Bella  Medication: Furosemide   Issue / Concern: He has only 4 left fortoday and tomorrow morning - he missed Bella appt today   Preferred Pharmacy/City: [facility] Phone Number for Patient: [phone]    Additional Info:

## 2022-01-12 NOTE — TELEPHONE ENCOUNTER
Spoke with pt to make sure he is coming in for lab work and he agree he would. Also cautioned him that if the SOB gets worse to go to ED. Pt agreed with plan.

## 2022-01-12 NOTE — TELEPHONE ENCOUNTER
Phone call to patient. He missed hospital follow-up with NP today. Patient notes continued shortness of breath since discharge. It has not gotten worse but has persisted. He increased diuretic to two 40 mg tablets daily because he was not seeing as much urine output and was afraid of more fluid building in his mid-section. Patient requesting refill.   Lisseth Huggins also reviewed with Ms. Keenan today. BMP ordered, patient notes he will complete today.   Patient is rescheduled for follow-up with HF NP on 1/18/22. -monroe    ----- Message -----  From: Violeta Gomez  Sent: 1/12/2022   3:29 PM CST  To: Meg Carmichael RN    General phone call:  PATIENT OF DR LANGLYE, FROM HOSPITAL, ARRIVED TO SEE VAN IN HF CLINIC 20 MINUTES LATE, WAS SOB. I RESCHEDULED TO SEE ANGELA 1-18-22 , FIRST AVAILABLE, AND HE ALSO WENT TO ED.  HE WANTS TO KNOW WHAT HE CAN DO, OR IF HE CAN GET IN SOONER THAN 01-18-22.  PLEASE CALL  Caller: PATIENT   Primary cardiologist: DR LANGLEY  Detailed reason for call: SEE ABOVE  New or active symptoms? SOB  Best phone number: [phone]  Best time to contact: ANY TIME  Ok to leave a detailedmessage? YES  Device? NO    Additional Info:

## 2022-01-13 DIAGNOSIS — I50.23 ACUTE ON CHRONIC SYSTOLIC CONGESTIVE HEART FAILURE (H): ICD-10-CM

## 2022-01-13 RX ORDER — FUROSEMIDE 40 MG
80 TABLET ORAL DAILY
Qty: 30 TABLET | Refills: 0 | Status: SHIPPED | OUTPATIENT
Start: 2022-01-13 | End: 2022-01-26

## 2022-02-10 ENCOUNTER — OFFICE VISIT (OUTPATIENT)
Dept: FAMILY MEDICINE | Facility: CLINIC | Age: 52
End: 2022-02-10
Payer: COMMERCIAL

## 2022-02-10 VITALS
DIASTOLIC BLOOD PRESSURE: 78 MMHG | SYSTOLIC BLOOD PRESSURE: 127 MMHG | HEART RATE: 117 BPM | OXYGEN SATURATION: 100 % | RESPIRATION RATE: 18 BRPM | WEIGHT: 174 LBS | BODY MASS INDEX: 27.25 KG/M2

## 2022-02-10 DIAGNOSIS — I50.23 ACUTE ON CHRONIC SYSTOLIC CONGESTIVE HEART FAILURE (H): ICD-10-CM

## 2022-02-10 LAB
ANION GAP SERPL CALCULATED.3IONS-SCNC: 14 MMOL/L (ref 5–18)
BUN SERPL-MCNC: 17 MG/DL (ref 8–22)
CALCIUM SERPL-MCNC: 9.3 MG/DL (ref 8.5–10.5)
CHLORIDE BLD-SCNC: 107 MMOL/L (ref 98–107)
CO2 SERPL-SCNC: 20 MMOL/L (ref 22–31)
CREAT SERPL-MCNC: 1.51 MG/DL (ref 0.7–1.3)
GFR SERPL CREATININE-BSD FRML MDRD: 56 ML/MIN/1.73M2
GLUCOSE BLD-MCNC: 111 MG/DL (ref 70–125)
POTASSIUM BLD-SCNC: 3.9 MMOL/L (ref 3.5–5)
SODIUM SERPL-SCNC: 141 MMOL/L (ref 136–145)

## 2022-02-10 PROCEDURE — 36415 COLL VENOUS BLD VENIPUNCTURE: CPT | Performed by: NURSE PRACTITIONER

## 2022-02-10 PROCEDURE — 99214 OFFICE O/P EST MOD 30 MIN: CPT | Performed by: NURSE PRACTITIONER

## 2022-02-10 PROCEDURE — 80048 BASIC METABOLIC PNL TOTAL CA: CPT | Performed by: NURSE PRACTITIONER

## 2022-02-10 RX ORDER — SPIRONOLACTONE 25 MG/1
25 TABLET ORAL DAILY
Qty: 30 TABLET | Refills: 0 | Status: SHIPPED | OUTPATIENT
Start: 2022-02-10

## 2022-02-10 RX ORDER — CARVEDILOL 3.12 MG/1
3.12 TABLET ORAL 2 TIMES DAILY WITH MEALS
Qty: 60 TABLET | Refills: 0 | Status: SHIPPED | OUTPATIENT
Start: 2022-02-10 | End: 2022-03-09

## 2022-02-10 RX ORDER — FUROSEMIDE 40 MG
TABLET ORAL
Qty: 30 TABLET | Refills: 0 | Status: SHIPPED | OUTPATIENT
Start: 2022-02-10 | End: 2022-03-01

## 2022-02-10 RX ORDER — LISINOPRIL 5 MG/1
5 TABLET ORAL DAILY
Qty: 30 TABLET | Refills: 0 | Status: SHIPPED | OUTPATIENT
Start: 2022-02-10

## 2022-02-10 NOTE — PATIENT INSTRUCTIONS
No changes to medications.    Blood pressure and other vital signs look okay today.    Follow-up with your cardiologist next week.    Recheck kidney labs and electrolytes today

## 2022-02-10 NOTE — PROGRESS NOTES
"  Assessment & Plan     Acute on chronic systolic congestive heart failure (H)  We reviewed the results of his echocardiogram in the exam room.  We reviewed the pathophysiological principles of systolic congestive heart failure.  I explained why he is taking the medications that he is.  Educated him on the importance of following up with his specialists.    Fortunately, he has a follow-up appointment with his cardiologist next week.  For now, we will refill his meds and check a BMP.  He appears to be doing well on his current regimen.  Vital signs reassuring.  Appears euvolemic on exam.  No longer sprinting shortness of breath or dyspnea on exertion    - spironolactone (ALDACTONE) 25 MG tablet; Take 1 tablet (25 mg) by mouth daily  - furosemide (LASIX) 40 MG tablet; TAKE 2 TABLETS(80 MG) BY MOUTH DAILY  - lisinopril (ZESTRIL) 5 MG tablet; Take 1 tablet (5 mg) by mouth daily  - carvedilol (COREG) 3.125 MG tablet; Take 1 tablet (3.125 mg) by mouth 2 times daily (with meals)  - Basic metabolic panel; Future    Patient Instructions   No changes to medications.    Blood pressure and other vital signs look okay today.    Follow-up with your cardiologist next week.    Recheck kidney labs and electrolytes today      Review of external notes as documented elsewhere in note  Prescription drug management  16 minutes spent on the date of the encounter doing chart review, history and exam, documentation and further activities per the note     BMI:   Estimated body mass index is 27.25 kg/m  as calculated from the following:    Height as of 1/2/22: 1.702 m (5' 7\").    Weight as of this encounter: 78.9 kg (174 lb).   Weight management plan: Discussed healthy diet and exercise guidelines    See Patient Instructions    Return in about 6 months (around 8/10/2022).    Khang Sanches, [facility]    Mira Lopez is a 51 year old who presents for the following health issues     HPI "     Here for hospital discharge follow-up.  He needs refills of his diuretics, carvedilol, and lisinopril today.    He was hospitalized for acute systolic congestive heart failure.  Echocardiogram during his hospitalization revealed ejection fraction of 25%.    He has moderate to severe mitral regurgitation.  Moderate tricuspid regurgitation.    He had a similar episode in 2006.  He has been following with a cardiologist at [facility] on a yearly basis.  He had been doing well at previous cardiology follow-up appointments and had his ACE and beta-blocker medications discontinued.    Now looking for refills of his medications.  Has been feeling well since his discharge from the hospital.      Review of Systems   Constitutional, HEENT, cardiovascular, pulmonary, gi and gu systems are negative, except as otherwise noted.      Objective    /78   Pulse 117   Resp 18   Wt 78.9 kg (174 lb)   SpO2 100%   BMI 27.25 kg/m    Body mass index is 27.25 kg/m .  Physical Exam   GENERAL: healthy, alert and no distress  NECK: no adenopathy, no asymmetry, masses, or scars and thyroid normal to palpation  RESP: lungs clear to auscultation - no rales, rhonchi or wheezes  CV: regular rate and rhythm, normal S1 S2, no S3 or S4, no murmur, click or rub, no peripheral edema and peripheral pulses strong  ABDOMEN: soft, nontender, no hepatosplenomegaly, no masses and bowel sounds normal  MS: no gross musculoskeletal defects noted, no edema

## 2022-03-01 DIAGNOSIS — I50.23 ACUTE ON CHRONIC SYSTOLIC CONGESTIVE HEART FAILURE (H): ICD-10-CM

## 2022-03-01 RX ORDER — FUROSEMIDE 40 MG
TABLET ORAL
Qty: 30 TABLET | Refills: 1 | Status: SHIPPED | OUTPATIENT
Start: 2022-03-01

## 2022-03-01 NOTE — TELEPHONE ENCOUNTER
Refill Request  Medication name: Pending Prescriptions:                       Disp   Refills    furosemide (LASIX) 40 MG tablet           30 tab*1            Sig: TAKE 2 TABLETS(80 MG) BY MOUTH DAILY    Who prescribed the medication: Verónica  Last refill on medication: 02/10/2022  Requested Pharmacy: [facility]  Last appointment with PCP: 02/10/2022  Next appointment: Not due

## 2022-03-09 DIAGNOSIS — I50.23 ACUTE ON CHRONIC SYSTOLIC CONGESTIVE HEART FAILURE (H): ICD-10-CM

## 2022-03-09 RX ORDER — CARVEDILOL 3.12 MG/1
3.12 TABLET ORAL 2 TIMES DAILY WITH MEALS
Qty: 30 TABLET | Refills: 0 | Status: SHIPPED | OUTPATIENT
Start: 2022-03-09 | End: 2022-03-24

## 2022-03-09 NOTE — TELEPHONE ENCOUNTER
Refill Request  Medication name: Pending Prescriptions:                       Disp   Refills    carvedilol (COREG) 3.125 MG tablet        60 tab*0            Sig: Take 1 tablet (3.125 mg) by mouth 2 times daily           (with meals)    Who prescribed the medication: Verónica  Last refill on medication: 2/10/22  Requested Pharmacy: [facility]  Next appointment: None    Should patient be obtaining refill from cardiology?  Mar Michaud CMA ............... 4:45 PM, 03/09/22

## 2022-03-14 DIAGNOSIS — I50.23 ACUTE ON CHRONIC SYSTOLIC CONGESTIVE HEART FAILURE (H): ICD-10-CM

## 2022-03-14 RX ORDER — SPIRONOLACTONE 25 MG/1
25 TABLET ORAL DAILY
Qty: 30 TABLET | Refills: 4 | Status: CANCELLED | OUTPATIENT
Start: 2022-03-14

## 2022-03-14 RX ORDER — SPIRONOLACTONE 25 MG/1
25 TABLET ORAL DAILY
Qty: 30 TABLET | Refills: 0 | OUTPATIENT
Start: 2022-03-14

## 2022-03-14 RX ORDER — LISINOPRIL 5 MG/1
5 TABLET ORAL DAILY
Qty: 30 TABLET | Refills: 4 | OUTPATIENT
Start: 2022-03-14

## 2022-03-24 DIAGNOSIS — I50.23 ACUTE ON CHRONIC SYSTOLIC CONGESTIVE HEART FAILURE (H): ICD-10-CM

## 2022-03-24 RX ORDER — CARVEDILOL 3.12 MG/1
3.12 TABLET ORAL 2 TIMES DAILY WITH MEALS
Qty: 30 TABLET | Refills: 0 | Status: SHIPPED | OUTPATIENT
Start: 2022-03-24

## 2022-03-24 NOTE — TELEPHONE ENCOUNTER
Refill Request  Medication name: Pending Prescriptions:                       Disp   Refills    carvedilol (COREG) 3.125 MG tablet        30 tab*0            Sig: Take 1 tablet (3.125 mg) by mouth 2 times daily           (with meals)    Who prescribed the medication: CAMPOS  Last refill on medication: 03/09/2022  Requested Pharmacy: [facility]  Last appointment with PCP: 02/10/2022  Next appointment: Not due

## 2022-07-12 DIAGNOSIS — I50.23 ACUTE ON CHRONIC SYSTOLIC CONGESTIVE HEART FAILURE (H): ICD-10-CM

## 2022-07-13 NOTE — TELEPHONE ENCOUNTER
"Routing refill request to provider for review/approval because:  Labs out of range:  cr    Last Written Prescription Date:  3/1/22  Last Fill Quantity: 30,  # refills: 1   Last office visit provider:  2/10/22     Requested Prescriptions   Pending Prescriptions Disp Refills     furosemide (LASIX) 40 MG tablet 30 tablet 1     Sig: TAKE 2 TABLETS(80 MG) BY MOUTH DAILY       Diuretics (Including Combos) Protocol Failed - 7/12/2022  1:44 PM        Failed - Recent (12 mo) or future (30 days) visit within the authorizing provider's specialty     Patient has had an office visit with the authorizing provider or a provider within the authorizing providers department within the previous 12 mos or has a future within next 30 days. See \"Patient Info\" tab in inbasket, or \"Choose Columns\" in Meds & Orders section of the refill encounter.              Failed - Normal serum creatinine on file in past 12 months     Recent Labs   Lab Test 02/10/22  1250   CR 1.51*              Passed - Blood pressure under 140/90 in past 12 months     BP Readings from Last 3 Encounters:   02/10/22 127/78   01/03/22 99/76   12/24/21 124/79                 Passed - Medication is active on med list        Passed - Patient is age 18 or older        Passed - Normal serum potassium on file in past 12 months     Recent Labs   Lab Test 02/10/22  1250   POTASSIUM 3.9                    Passed - Normal serum sodium on file in past 12 months     Recent Labs   Lab Test 02/10/22  1250                      Lisseth Sanabria, RN 07/13/22 10:42 AM  "

## 2022-07-14 NOTE — TELEPHONE ENCOUNTER
"Routing refill request to provider for review/approval because:  Labs out of range:  Creatinine  Last visit at [facility]  No PCP - patient should be contacted to determine a location for assignment of PCP    Last Written Prescription Date:  3/1/22  Last Fill Quantity: 30,  # refills: 1   Last office visit provider:  2/10/22     Requested Prescriptions   Pending Prescriptions Disp Refills     furosemide (LASIX) 40 MG tablet 30 tablet 1     Sig: TAKE 2 TABLETS(80 MG) BY MOUTH DAILY       Diuretics (Including Combos) Protocol Failed - 7/14/2022 10:13 AM        Failed - Recent (12 mo) or future (30 days) visit within the authorizing provider's specialty     Patient has had an office visit with the authorizing provider or a provider within the authorizing providers department within the previous 12 mos or has a future within next 30 days. See \"Patient Info\" tab in inbasket, or \"Choose Columns\" in Meds & Orders section of the refill encounter.              Failed - Normal serum creatinine on file in past 12 months     Recent Labs   Lab Test 02/10/22  1250   CR 1.51*              Passed - Blood pressure under 140/90 in past 12 months     BP Readings from Last 3 Encounters:   02/10/22 127/78   01/03/22 99/76   12/24/21 124/79                 Passed - Medication is active on med list        Passed - Patient is age 18 or older        Passed - Normal serum potassium on file in past 12 months     Recent Labs   Lab Test 02/10/22  1250   POTASSIUM 3.9                    Passed - Normal serum sodium on file in past 12 months     Recent Labs   Lab Test 02/10/22  1250                      Gume Gatica RN 07/14/22 10:20 AM  "

## 2022-07-21 RX ORDER — FUROSEMIDE 40 MG
TABLET ORAL
Qty: 30 TABLET | Refills: 1 | OUTPATIENT
Start: 2022-07-21

## 2022-07-21 NOTE — TELEPHONE ENCOUNTER
Patient is seeing a cardiologist for medications.  He needs to get medications from his cardiology team